# Patient Record
Sex: MALE | Race: WHITE
[De-identification: names, ages, dates, MRNs, and addresses within clinical notes are randomized per-mention and may not be internally consistent; named-entity substitution may affect disease eponyms.]

---
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
## 2018-01-02 ENCOUNTER — HOSPITAL ENCOUNTER (EMERGENCY)
Dept: HOSPITAL 62 - ER | Age: 18
Discharge: HOME | End: 2018-01-02
Payer: MEDICAID

## 2018-01-02 VITALS — DIASTOLIC BLOOD PRESSURE: 81 MMHG | SYSTOLIC BLOOD PRESSURE: 156 MMHG

## 2018-01-02 DIAGNOSIS — R06.2: Primary | ICD-10-CM

## 2018-01-02 PROCEDURE — 94640 AIRWAY INHALATION TREATMENT: CPT

## 2018-01-02 PROCEDURE — 99284 EMERGENCY DEPT VISIT MOD MDM: CPT

## 2018-01-02 NOTE — ER DOCUMENT REPORT
HPI





- HPI


Patient complains to provider of: wheezing ran out of albuter MDI


Onset: Yesterday


Pain Level: Denies


Context: 





16 yo male used his albuterol MDI up yesterday and woke up wheezing this am. No 

fever or chest pain. Hx asthma.


Associated Symptoms: None


Exacerbated by: Denies


Relieved by: Other - albuterol MDI


Similar symptoms previously: Yes


Recently seen / treated by doctor: No





- ROS


ROS below otherwise negative: Yes


Systems Reviewed and Negative: Yes All other systems reviewed and negative





- REPRODUCTIVE


Reproductive: DENIES: Pregnant:





Past Medical History





- General


Information source: Patient





- Social History


Smoking Status: Never Smoker


Frequency of alcohol use: None


Drug Abuse: None


Lives with: Family


Family History: Reviewed & Not Pertinent


Pulmonary Medical History: Reports: Hx Asthma


Renal/ Medical History: Denies: Hx Peritoneal Dialysis


Surgical Hx: Negative





Vertical Provider Document





- CONSTITUTIONAL


Agree With Documented VS: Yes


Exam Limitations: No Limitations


General Appearance: No Apparent Distress





- INFECTION CONTROL


TRAVEL OUTSIDE OF THE [place] IN LAST 30 DAYS: No





- HEENT


HEENT: Normal ENT Exam, Normocephalic





- NECK


Neck: Supple.  negative: Lymphadenopathy-Left, Lymphadenopathy-Right





- RESPIRATORY


Respiratory: Wheezing - mild insp bilaterally


O2 Sat by Pulse Oximetry: 98





- CARDIOVASCULAR


Cardiovascular: Regular Rate, Regular Rhythm





- MUSCULOSKELETAL/EXTREMETIES


Musculoskeletal/Extremeties: MAEW, FROM





- NEURO


Level of Consciousness: Awake, Alert, Appropriate





- DERM


Integumentary: Warm, Dry, No Rash





Course





- Re-evaluation


Re-evalutation: 





01/02/18 19:19


wheeze goen during treatment. Pt thinks the 1 tx will be enough. 





- Vital Signs


Vital signs: 


 











Temp Pulse Resp BP Pulse Ox


 


 98.2 F   97 H  19   156/81 H  98 


 


 01/02/18 17:13  01/02/18 17:13  01/02/18 17:13  01/02/18 17:13  01/02/18 17:13














Discharge





- Discharge


Clinical Impression: 


 Inspiratory wheezing determined by examination, hx asthma





Condition: Good


Disposition: HOME, SELF-CARE


Instructions:  Asthma ([facility]), Bronchitis With Bronchospasm (Wheezing) ([facility]), 

Family Physicians / Practices, Inhaled Bronchodilators ([facility]), Steroid Medication


Additional Instructions: 


albuterol MDI every 3 hours for cough and wheeze


to er if worse


see family practice doctor for follow up


prednisone for 4 more days


Prescriptions: 


Albuterol Sulfate [Proair HFA Inhalation Aerosol 8.5 gm MDI] 2 puff IH Q3HP PRN 

#1 hfa.aer.[email or web address]


 PRN Reason: 


Prednisone [Deltasone 20 mg Tablet] 40 mg PO DAILY #8 tablet

## 2018-01-28 ENCOUNTER — HOSPITAL ENCOUNTER (EMERGENCY)
Dept: HOSPITAL 62 - ER | Age: 18
Discharge: HOME | End: 2018-01-28
Payer: SELF-PAY

## 2018-01-28 VITALS — SYSTOLIC BLOOD PRESSURE: 151 MMHG | DIASTOLIC BLOOD PRESSURE: 81 MMHG

## 2018-01-28 DIAGNOSIS — J45.909: Primary | ICD-10-CM

## 2018-01-28 PROCEDURE — 99285 EMERGENCY DEPT VISIT HI MDM: CPT

## 2018-01-28 NOTE — ER DOCUMENT REPORT
HPI





- HPI


Pain Level: Denies


Notes: 





Patient is a 17-year-old male with a history of asthma who presents the ED 

requesting an inhaler.  Patient states that he has had some wheezing lately, 

but has not had an inhaler to use.  Patient states that he cannot afford an 

inhaler at this time.  Patient denies any other recent illness.  Patient states 

that he feels a little bit wheezy right now, but does not want any treatment 

for aside from the inhaler.  He denies any drug allergies.  Denies any smoking 

or IV drug use.  Patient states that he still eating and drinking without 

difficulties.  He is urinating normally and having normal bowel movements.  

Denies any headache, fever, neck pain, URI, sore throat, chest pain, 

palpitations, syncope, cough, shortness of breath, dyspnea, abdominal pain, 

nausea/vomiting/diarrhea, urinary retention, dysuria, hematuria, loss of 

control of bowel or bladder, numbness/tingling, saddle anesthesia, muscle 

paralysis/weakness, or rash.





- ROS


Systems Reviewed and Negative: Yes All other systems reviewed and negative





- RESPIRATORY


Respiratory: REPORTS: Trouble Breathing





- REPRODUCTIVE


Reproductive: DENIES: Pregnant:





Past Medical History





- Social History


Smoking Status: Never Smoker


Chew tobacco use (# tins/day): No


Frequency of alcohol use: None


Drug Abuse: None


Family History: Reviewed & Not Pertinent


Patient has suicidal ideation: No


Patient has homicidal ideation: No


Pulmonary Medical History: Reports: Hx Asthma


Renal/ Medical History: Denies: Hx Peritoneal Dialysis





Vertical Provider Document





- CONSTITUTIONAL


Agree With Documented VS: Yes


Notes: 





PHYSICAL EXAMINATION:





GENERAL: Well-appearing, well-nourished and in no acute distress.  A&Ox4. 

Answers questions appropriately.





HEAD: Atraumatic, normocephalic.





EYES: Pupils equal round and reactive to light, extraocular movements intact, 

sclera anicteric, conjunctiva are normal.





ENT: Nares patent and without discharge.  oropharynx clear without exudates.  

No tonsilar hypertrophy or erythema.  Moist mucous membranes. 





NECK: Normal range of motion, supple without lymphadenopathy





LUNGS: scant b/l wheeze at base.  no crackles.  No retractions





HEART: Regular rate and rhythm without murmurs, rubs, gallops.





Extremities:  No cyanosis, clubbing, or edema b/l.  Peripheral pulses 2+.  

Capillary refill less than 3 seconds.





NEUROLOGICAL: Normal speech, normal gait.  Normal sensory, motor exams 





PSYCH: Normal mood, normal affect.





SKIN: Warm, Dry, normal turgor, no rashes or lesions noted.





- INFECTION CONTROL


TRAVEL OUTSIDE OF THE [place] IN LAST 30 DAYS: No





- RESPIRATORY


O2 Sat by Pulse Oximetry: 98





Course





- Re-evaluation


Re-evalutation: 





01/28/18 13:47


Patient is an afebrile, well-hydrated, 17-year-old male who presents ED with 

mild wheezing secondary to history of asthma.  Vitals are stable.  PE is 

otherwise unremarkable.  An albuterol inhaler dispensed was provided today.  

Patient declined any imaging or breathing treatments with risks and benefits 

understood.  Low suspicion for any ACS, PE, pneumothorax, pericarditis, 

dissection, respiratory compromise, severe dehydration, sepsis, meningitis, or 

other systemic emergent condition at this time.  Patient is aware that his 

condition can change from initial presentation and he needs to monitor symptoms 

closely and seek medical attention for any acute changes.  Recommend 

conservative measures for symptoms.  Recheck with your PCM in 3-5 days.  Return 

to the ED with any worsening/concerning symptoms otherwise as reviewed in 

discharge.  Patient is in agreement.





- Vital Signs


Vital signs: 


 











Temp Pulse Resp BP Pulse Ox


 


 98.9 F   105 H  20   151/81 H  98 


 


 01/28/18 13:16  01/28/18 13:16  01/28/18 13:16  01/28/18 13:16  01/28/18 13:16














Discharge





- Discharge


Clinical Impression: 


 Wheezing





Condition: Stable


Disposition: HOME, SELF-CARE


Instructions:  Bronchodilators ([facility])


Additional Instructions: 


Maintain adequate fluid intake


Take meds as directed


tylenol/ibuprofen as needed


over the counter cold medication as needed for symptoms


Humidified air may help for any cough


F/u:  with your PCM in 3-5 days for a recheck





Return to the ED with any fever, worsening pain, chest pain, palpitations, 

syncope, worsening HA, neck pain/stiffness, shortness of breath, wheezing, 

drooling, trouble swallowing/breathing, abdominal pain, n/v/d, rash, or 

worsening/concerning symptoms otherwise.


Prescriptions: 


Albuterol Sulfate [Proair HFA Inhalation Aerosol 8.5 gm MDI] 2 puff IH Q4H PRN #

1 mdi


 PRN Reason: 


Forms:  Elevated Blood Pressure


Referrals: 


[facility] [Provider Group] - Follow up as needed


[facility] [Provider Group] - Follow up as needed

## 2018-05-09 ENCOUNTER — HOSPITAL ENCOUNTER (EMERGENCY)
Dept: HOSPITAL 62 - ER | Age: 18
Discharge: HOME | End: 2018-05-09
Payer: SELF-PAY

## 2018-05-09 VITALS — SYSTOLIC BLOOD PRESSURE: 159 MMHG | DIASTOLIC BLOOD PRESSURE: 85 MMHG

## 2018-05-09 DIAGNOSIS — J45.901: Primary | ICD-10-CM

## 2018-05-09 DIAGNOSIS — T48.6X6A: ICD-10-CM

## 2018-05-09 DIAGNOSIS — Z91.120: ICD-10-CM

## 2018-05-09 DIAGNOSIS — Z91.14: ICD-10-CM

## 2018-05-09 PROCEDURE — 99284 EMERGENCY DEPT VISIT MOD MDM: CPT

## 2018-05-09 NOTE — ER DOCUMENT REPORT
ED General





- General


Mode of Arrival: Ambulatory


Information source: Patient


TRAVEL OUTSIDE OF THE [place] IN LAST 30 DAYS: No





- General


Chief Complaint: Breathing Difficulty


Stated Complaint: DIFFICULTY BREATHING


Time Seen by Provider: 05/09/18 20:51


Notes: 


Patient is a 17-year-old male with a history of asthma presents to the 

emergency department complaining of difficulty breathing which he describes as 

tightness onset around 1300 today.  Patient states that he ran out of his 

inhaler approximately 3 days ago.  He also mentions he does not have any 

insurance so he has not been able to see a primary care physician.


 (JENNYFER VILLANUEVA)





- Related Data


Allergies/Adverse Reactions: 


 





No Known Allergies Allergy (Verified 01/28/18 13:11)


 











Past Medical History





- General


Information source: Patient





- Social History


Smoking Status: Never Smoker


Chew tobacco use (# tins/day): No


Frequency of alcohol use: None


Drug Abuse: None


Family History: Reviewed & Not Pertinent


Patient has suicidal ideation: No


Patient has homicidal ideation: No


Pulmonary Medical History: Reports: Hx Asthma


Renal/ Medical History: Denies: Hx Peritoneal Dialysis





Review of Systems





- Review of Systems


Constitutional: No symptoms reported


EENT: No symptoms reported


Cardiovascular: No symptoms reported


Respiratory: See HPI


Gastrointestinal: No symptoms reported


Genitourinary: No symptoms reported


Male Genitourinary: No symptoms reported


Musculoskeletal: No symptoms reported


Skin: No symptoms reported


Hematologic/Lymphatic: No symptoms reported


Neurological/Psychological: No symptoms reported





Physical Exam





- Vital signs


Vitals: 


 











Temp Pulse Resp BP Pulse Ox


 


 98.2 F   97 H  20   159/85 H  94 


 


 05/09/18 20:11  05/09/18 20:11  05/09/18 20:11  05/09/18 20:11  05/09/18 20:11














- Notes


Notes: 


GENERAL: Alert, interacts well. No acute distress.


HEAD: Normocephalic, atraumatic.


EYES: Pupils equal, round, and reactive to light. Extraocular movements intact.


ENT: Oral mucosa moist, tongue midline. 


NECK: Full range of motion. Supple. Trachea midline.


LUNGS: Diffuse expiratory wheezes. No rhonchi. No respiratory distress.


HEART: Regular rate and rhythm. No murmurs, gallops, or rubs.


EXTREMITIES: Moves all 4 extremities spontaneously. 


NEUROLOGICAL: Alert and oriented x3. Normal speech. 


PSYCH: Normal affect, normal mood.


SKIN: Warm, dry, normal turgor. No rashes or lesions noted.





 (JENNYFER VILLANUEVA)





Course





- Re-evaluation


Re-evalutation: 





05/09/18 21:12


The patient comes to the emergency room to get 3 albuterol inhalers because he 

states he does not have insurance.  He lives with his parents, he works as a 

 for a company that contracts with [employer].  He does not smoke.  He 

states he has to spend his money helping his parents with the bills. (MALIKA FRANCISCO)





- Vital Signs


Vital signs: 


 











Temp Pulse Resp BP Pulse Ox


 


 98.2 F   97 H  20   159/85 H  94 


 


 05/09/18 20:11  05/09/18 20:11  05/09/18 20:11  05/09/18 20:11  05/09/18 20:11














Discharge





- Discharge


Clinical Impression: 


Asthma exacerbation


Qualifiers:


 Asthma severity: mild Asthma persistence: unspecified Qualified Code(s): 

J45.901 - Unspecified asthma with (acute) exacerbation





Condition: Stable


Disposition: HOME, SELF-CARE


Additional Instructions: 


Use the inhaler 2 puffs every 4 hours as needed for wheezing.


Start the prednisone as prescribed tomorrow.


Drink plenty of fluids and get plenty of rest.


Follow-up with a local medical doctor or the [facility] for 

medical care.


Prescriptions: 


Albuterol Sulfate [Proair HFA] 1 - 2 puff IH Q4 PRN #1 inhaler


 PRN Reason: 


Prednisone [Deltasone 10 mg Tablet] 10 mg PO ASDIR PRN #21 tablet


 PRN Reason: 


Scribe Attestation: 





05/09/18 21:15


I personally performed the services described in the documentation, reviewed 

and edited the documentation which was dictated to the scribe in my presence, 

and it accurately records my words and actions. (MALIKA FRANCISCO)





Scribe Documentation





- Scribe


Written by Mariaelena:: Mariaelena Crisostomo, 5/9/2018 21:00


acting as scribe for :: Gurdeep

## 2018-07-02 ENCOUNTER — HOSPITAL ENCOUNTER (EMERGENCY)
Dept: HOSPITAL 62 - ER | Age: 18
Discharge: HOME | End: 2018-07-02
Payer: SELF-PAY

## 2018-07-02 VITALS — SYSTOLIC BLOOD PRESSURE: 145 MMHG | DIASTOLIC BLOOD PRESSURE: 72 MMHG

## 2018-07-02 DIAGNOSIS — J45.21: ICD-10-CM

## 2018-07-02 DIAGNOSIS — R06.02: ICD-10-CM

## 2018-07-02 DIAGNOSIS — Z76.0: Primary | ICD-10-CM

## 2018-07-02 PROCEDURE — 99281 EMR DPT VST MAYX REQ PHY/QHP: CPT

## 2018-07-31 ENCOUNTER — HOSPITAL ENCOUNTER (EMERGENCY)
Dept: HOSPITAL 62 - ER | Age: 18
Discharge: HOME | End: 2018-07-31
Payer: SELF-PAY

## 2018-07-31 VITALS — DIASTOLIC BLOOD PRESSURE: 85 MMHG | SYSTOLIC BLOOD PRESSURE: 136 MMHG

## 2018-07-31 DIAGNOSIS — J45.21: Primary | ICD-10-CM

## 2018-07-31 PROCEDURE — 99284 EMERGENCY DEPT VISIT MOD MDM: CPT

## 2018-07-31 NOTE — ER DOCUMENT REPORT
ED General





- General


Chief Complaint: Breathing Difficulty


Stated Complaint: DIFFICULTY BREATHING


Time Seen by Provider: 07/31/18 06:44


TRAVEL OUTSIDE OF THE [place] IN LAST 30 DAYS: No





- HPI


Notes: 





18-year-old male with history of asthma presents to the ER complaining of some 

wheezing and some mild difficulty breathing.  Since inhaler broke last night.  

He stated he needed a few puffs but could not get it states he is feeling 

better now.  Does not want a breathing treatment just wants a new inhaler.  

States he does not have any insurance denies any chest pain denies sore throat 

cough fever chills.  Denies calf pain or leg swelling.





- Related Data


Allergies/Adverse Reactions: 


 





No Known Allergies Allergy (Verified 01/28/18 13:11)


 











Past Medical History





- Social History


Smoking Status: Never Smoker


Chew tobacco use (# tins/day): No


Frequency of alcohol use: None


Drug Abuse: None


Family History: Reviewed & Not Pertinent


Patient has suicidal ideation: No


Patient has homicidal ideation: No


Pulmonary Medical History: Reports: Hx Asthma


Renal/ Medical History: Denies: Hx Peritoneal Dialysis





Review of Systems





- Review of Systems


Respiratory: Cough, Short of breath, Wheezing


Gastrointestinal: denies: Nausea, Vomiting


Genitourinary: denies: Dysuria


Skin: denies: Rash


-: Yes All other systems reviewed and negative





Physical Exam





- Vital signs


Vitals: 





 











Temp Pulse Resp BP Pulse Ox


 


 98.3 F   85   16   158/77 H  96 


 


 07/31/18 06:26  07/31/18 06:26  07/31/18 06:26  07/31/18 06:26  07/31/18 06:26














- Notes


Notes: 





GENERAL_APPEARANCE: well_nourished, alert, cooperative, no_acute_distress, no_

obvious_discomfort.


 VITALS: reviewed, see vital signs table.


 HEAD: no_swelling\tenderness on the head.


 EYES: PERRL, EOMI, conjunctiva_clear.


 NOSE: no_nasal_discharge.


 MOUTH: (-)decreased moisture.


 THROAT: no_tonsilar_inflammation, no_airway_obstruction. no_lymphadenopathy


 NECK: supple, (-)thyromegaly.


 BACK: no_back_tenderness.


 CHEST_WALL: no_chest_tenderness.


 LUNGS: Scant_wheezing, no_rales, no_rhonchi, (-)accessory muscle use, good air 

exchange bilateral.


 HEART: normal_rate, normal_rhythm, normal_S1, normal_S2, (-)S3, (-)S4, no_

murmur, no_rub.


 ABDOMEN:  soft, no_abd_tenderness, (-)guarding, (-)rebound, no_organomegaly, no

_abd_masses.


 EXTREMITIES:  good pulses in all_extremities, no_swelling\tenderness in the 

extremities, no_edema.


 SKIN: warm, dry, good_color, no_rash.


 MENTAL_STATUS: speech_clear, oriented_X_3, normal_affect, responds_

appropriately to questions.


 











Course





- Re-evaluation


Re-evalutation: 





07/31/18 07:11


She is doing better now he declines a breathing treatment he just wants an 

inhaler will give him 2 puffs of inhaler here and try to dispense them on.  

Also give him a prescription.  He states this began last night he would have 

been fine if he would had his inhaler.  I do not think any steroids or 

antibiotics are needed he has no fever or chills.  No oxygen requirements.





- Vital Signs


Vital signs: 





 











Temp Pulse Resp BP Pulse Ox


 


 98.3 F   85   16   158/77 H  96 


 


 07/31/18 06:26  07/31/18 06:26  07/31/18 06:26  07/31/18 06:26  07/31/18 06:26














Discharge





- Discharge


Clinical Impression: 


Asthma exacerbation


Qualifiers:


 Asthma severity: mild Asthma persistence: intermittent Qualified Code(s): 

J45.21 - Mild intermittent asthma with (acute) exacerbation





Condition: Good


Disposition: HOME, SELF-CARE


Instructions:  Asthma ([facility])


Prescriptions: 


Albuterol Sulfate [Proair HFA Inhalation Aerosol 8.5 gm MDI] 2 puff IH Q4H PRN #

1 mdi


 PRN Reason: 


Forms:  Return to Work

## 2018-10-18 ENCOUNTER — HOSPITAL ENCOUNTER (EMERGENCY)
Dept: HOSPITAL 62 - ER | Age: 18
Discharge: HOME | End: 2018-10-18
Payer: SELF-PAY

## 2018-10-18 VITALS — DIASTOLIC BLOOD PRESSURE: 81 MMHG | SYSTOLIC BLOOD PRESSURE: 155 MMHG

## 2018-10-18 DIAGNOSIS — J45.20: Primary | ICD-10-CM

## 2018-10-18 PROCEDURE — 99284 EMERGENCY DEPT VISIT MOD MDM: CPT

## 2018-10-27 ENCOUNTER — HOSPITAL ENCOUNTER (EMERGENCY)
Dept: HOSPITAL 62 - ER | Age: 18
Discharge: HOME | End: 2018-10-27
Payer: COMMERCIAL

## 2018-10-27 VITALS — SYSTOLIC BLOOD PRESSURE: 155 MMHG | DIASTOLIC BLOOD PRESSURE: 64 MMHG

## 2018-10-27 DIAGNOSIS — M79.662: Primary | ICD-10-CM

## 2018-10-27 DIAGNOSIS — J45.909: ICD-10-CM

## 2018-10-27 PROCEDURE — 99283 EMERGENCY DEPT VISIT LOW MDM: CPT

## 2018-10-27 NOTE — ER DOCUMENT REPORT
HPI





- HPI


Pain Level: 3


Notes: 





Patient is a 18-year-old male who presents with chief complaint of left shin 

pain.  Patient reports he just started a new job at [employer] as a  

and his leg pain started after starting work.  Patient states that the pain is 

so bad he had to call in sick today.








- REPRODUCTIVE


Reproductive: DENIES: Pregnant:





Past Medical History





- General


Information source: Patient





- Social History


Smoking Status: Never Smoker


Frequency of alcohol use: None


Drug Abuse: None


Family History: Reviewed & Not Pertinent


Pulmonary Medical History: Reports: Hx Asthma


Renal/ Medical History: Denies: Hx Peritoneal Dialysis


Past Surgical History: Reports: None





- Immunizations


Immunizations up to date: Yes





Vertical Provider Document





- CONSTITUTIONAL


Notes: 





PHYSICAL EXAMINATION:





GENERAL: Well-appearing, well-nourished and in no acute distress.





HEAD: Atraumatic, normocephalic.





EYES: Pupils equal round extraocular movements intact,  conjunctiva are normal.





ENT: Nares patent





NECK: Normal range of motion





LUNGS: No respiratory distress





Musculoskeletal: Normal range of motion, no erythema, ecchymosis or swelling 

noted to the left leg.





NEUROLOGICAL:  Normal speech, normal gait. 





PSYCH: Normal mood, normal affect.





SKIN: Warm, Dry, normal turgor, no rashes or lesions noted.





- INFECTION CONTROL


TRAVEL OUTSIDE OF THE [place] IN LAST 30 DAYS: No





Course





- Re-evaluation


Re-evalutation: 





X-rays negative for any acute findings.  Patient will be instructed to take 

ibuprofen if he is experiencing pain or inflammation.  He can also ice and 

elevate the extremities.  Patient cleared to return to work for tomorrow.





- Vital Signs


Vital signs: 


 











Temp Pulse Resp BP Pulse Ox


 


 98.3 F   99   15   155/64 H  100 


 


 10/27/18 14:42  10/27/18 14:42  10/27/18 14:42  10/27/18 14:42  10/27/18 14:42














Discharge





- Discharge


Clinical Impression: 


 Pain in left shin





Condition: Stable


Disposition: HOME, SELF-CARE


Additional Instructions: 


Your workup today was normal.  There is no evidence of any fracture or 

dislocation on your x-ray.  You may be suffering from something called 

shinsplints.  You may want to apply ice to the area, take ibuprofen 600 mg 

every 6 hours and also wears shoes that are more supportive.


Forms:  Return to Work

## 2018-10-27 NOTE — RADIOLOGY REPORT (SQ)
EXAM DESCRIPTION:  TIBIA FIBULA LEFT



COMPLETED DATE/TIME:  10/27/2018 3:27 pm



REASON FOR STUDY:  left shin pain   .  Anterior distal tib/fib pain.



COMPARISON:  None.



NUMBER OF VIEWS:  Two views.



TECHNIQUE:  Two radiographic images acquired of the left tibia and fibula to include the knee and ank
le in at least one projection.



LIMITATIONS:  None.



FINDINGS:  MINERALIZATION: Normal.

BONES: No acute fracture or dislocation.

SOFT TISSUES: No obvious swelling or radiopaque foreign body.



IMPRESSION:  No radiographic evidence of acute injury.



TECHNICAL DOCUMENTATION:  JOB ID:  [id number]

[id number]

 2011 [employer]- All Rights Reserved



Reading location - IP/workstation name: [email or web address]

## 2018-12-05 ENCOUNTER — HOSPITAL ENCOUNTER (EMERGENCY)
Dept: HOSPITAL 62 - ER | Age: 18
Discharge: HOME | End: 2018-12-05
Payer: SELF-PAY

## 2018-12-05 VITALS — SYSTOLIC BLOOD PRESSURE: 139 MMHG | DIASTOLIC BLOOD PRESSURE: 79 MMHG

## 2018-12-05 DIAGNOSIS — R09.82: ICD-10-CM

## 2018-12-05 DIAGNOSIS — J06.9: Primary | ICD-10-CM

## 2018-12-05 DIAGNOSIS — J34.89: ICD-10-CM

## 2018-12-05 DIAGNOSIS — R09.81: ICD-10-CM

## 2018-12-05 DIAGNOSIS — R05: ICD-10-CM

## 2018-12-05 DIAGNOSIS — J45.909: ICD-10-CM

## 2018-12-05 PROCEDURE — 99284 EMERGENCY DEPT VISIT MOD MDM: CPT

## 2018-12-05 NOTE — ER DOCUMENT REPORT
ED Respiratory Problem





- General


Chief Complaint: Shortness Of Breath


Stated Complaint: DIFFICULTY BREATHING


Time Seen by Provider: 12/05/18 17:02


Mode of Arrival: Ambulatory


Information source: Patient


Notes: 





18-year-old male presented to ED for complaint of cough and wheezing this 

morning when he woke up.  He is a asthmatic.  He does have an upper respiratory 

infection.  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  He states 

he does not have insurance and has no way to purchase an albuterol inhaler at 

this time until he gets paid.  Patient is alert and oriented respirations 

regular and unlabored speaking in full sentences walks with a even steady gait.


TRAVEL OUTSIDE OF THE [place] IN LAST 30 DAYS: No





- HPI


Patient complains to provider of: Asthma, Other - URI


Onset: This morning


Initiating Event: URI


Quality of pain: No pain


Severity: None


Pain Level: Denies


Context: Hx asthma


Associated symptoms: Cough, PND, Runny nose, Sinus pain/pressure, Wheezing


Similar symptoms previously: Yes


Recently seen / treated by doctor: No





- Related Data


Allergies/Adverse Reactions: 


 





No Known Allergies Allergy (Verified 12/05/18 15:43)


 











Past Medical History





- General


Information source: Patient





- Social History


Smoking Status: Never Smoker


Cigarette use (# per day): No


Chew tobacco use (# tins/day): No


Smoking Education Provided: No


Frequency of alcohol use: None


Drug Abuse: None


Lives with: Family


Family History: Reviewed & Not Pertinent


Patient has suicidal ideation: No


Patient has homicidal ideation: No





- Past Medical History


Cardiac Medical History: Reports: None


Pulmonary Medical History: Reports: Hx Asthma


EENT Medical History: Reports: None


Neurological Medical History: Reports: None


Endocrine Medical History: Reports: None


Renal/ Medical History: Reports: None


Malignancy Medical History: Reports None


GI Medical History: Reports: None


Musculoskeletal Medical History: Reports None


Skin Medical History: Reports None


Psychiatric Medical History: Reports: None


Traumatic Medical History: Reports: None


Infectious Medical History: Reports: None


Surgical Hx: Negative


Past Surgical History: Reports: None





- Immunizations


Immunizations up to date: Yes





Review of Systems





- Review of Systems


Constitutional: No symptoms reported


EENT: Nose congestion, Nose discharge, Sinus pressure, Sinus discharge


Cardiovascular: No symptoms reported


Respiratory: Cough, Wheezing


Gastrointestinal: No symptoms reported


Genitourinary: No symptoms reported


Male Genitourinary: No symptoms reported


Musculoskeletal: No symptoms reported


Skin: No symptoms reported


Hematologic/Lymphatic: No symptoms reported


Neurological/Psychological: No symptoms reported


-: Yes All other systems reviewed and negative





Physical Exam





- Vital signs


Vitals: 


 











Temp Pulse Resp BP Pulse Ox


 


 98.0 F   86   16   150/54 H  100 


 


 12/05/18 15:58  12/05/18 15:58  12/05/18 15:58  12/05/18 15:58  12/05/18 15:58











Interpretation: Normal





- General


General appearance: Appears well, Alert





- HEENT


Head: Normocephalic, Atraumatic


Eyes: Normal


Pupils: PERRL


Ears: Normal


External canal: Normal


Tympanic membrane: Normal


Sinus: Normal


Nasal: Swelling, Clear rhinorrhea


Mouth/Lips: Normal


Mucous membranes: Normal


Pharynx: Post nasal drainage


Neck: Normal





- Respiratory


Respiratory status: No respiratory distress


Chest status: Nontender


Breath sounds: Nonproductive cough


Chest palpation: Normal





- Cardiovascular


Rhythm: Regular


Heart sounds: Normal auscultation


Murmur: No





- Abdominal


Inspection: Normal


Distension: No distension


Bowel sounds: Normal


Tenderness: Nontender


Organomegaly: No organomegaly





- Back


Back: Normal, Nontender





- Extremities


General upper extremity: Normal inspection, Nontender, Normal color, Normal ROM

, Normal temperature


General lower extremity: Normal inspection, Nontender, Normal color, Normal ROM

, Normal temperature, Normal weight bearing.  No: Manny's sign





- Neurological


Neuro grossly intact: Yes


Cognition: Normal


Orientation: AAOx4


Riccardo Coma Scale Eye Opening: Spontaneous


[place] Coma Scale Verbal: Oriented


Riccardo Coma Scale Motor: Obeys Commands


[place] Coma Scale Total: 15


Speech: Normal


Motor strength normal: LUE, RUE, LLE, RLE


Sensory: Normal





- Psychological


Associated symptoms: Normal affect, Normal mood





- Skin


Skin Temperature: Warm


Skin Moisture: Dry


Skin Color: Normal





Course





- Vital Signs


Vital signs: 


 











Temp Pulse Resp BP Pulse Ox


 


 98.0 F   86   16   139/79 H  100 


 


 12/05/18 15:58  12/05/18 15:58  12/05/18 15:58  12/05/18 17:21  12/05/18 15:58














Discharge





- Discharge


Clinical Impression: 


 uri with hx of asthma





Condition: Stable


Disposition: HOME, SELF-CARE


Additional Instructions: 


UPPER RESPIRATORY ILLNESS:





     You have a viral infection of the respiratory passages -- a "cold."  This 

common infection causes nasal congestion, drainage, and often sore throat and 

cough.  It is highly contagious.  The disease usually lasts about 10 to 14 days.


     There is no "cure" for the viral infection -- it must run its course.  If 

there is a complication, such as bacterial infection in the nose, sinuses, 

middle ear, or bronchial tubes, antibiotics may be required.  The antibiotics 

won't affect the virus.


     Drink plenty of fluids.  A humidifier may help.  An expectorant medication 

or decongestant may make you more comfortable.  Use acetaminophen or ibuprofen 

for fever or aches.


     See the doctor if fever persists over two days, if there is any 

significant worsening of your symptoms, or if you simply fail to improve as 

expected.








COUGH-SUPPRESSANT & EXPECTORANT MEDICATION:


     You are to use a cough medication as needed for relief of symptoms.  This 

medicine is a combination of an expectorant (to make the mucous thinner and 

more easily "coughed up") and a cough suppressant (to reduce the frequency of 

coughing).


     The cough-suppressant medicine is related to narcotics.  You may 

experience mild nausea and sleepiness.  Some patients who are very sensitive to 

narcotics may have stomach pain from this medicine. Taking the medicine with 

food reduces these side effects.  Do not drive or work with machinery until you 

know how this medicine affects you.


     The expectorant should have no side effects.  Iodine-containing 

expectorants (such as organidin) should not be taken by persons with active 

thyroid disease unless approved by your doctor.


     Call the doctor if you develop shortness of breath, hives, rash, itching, 

lightheadedness, or severe nausea and vomiting.








INHALED BRONCHODILATORS:


     You have received a treatment of and/or prescription for an inhaled 

bronchodilator -- a medication which stimulates the airways in the lung to 

dilate.  This improves the flow of air in asthma, bronchitis, and emphysema.


     These medicines have some similarity to adrenaline, and can cause similar 

side effects:  shakiness, racing heart, and a sense of nervousness.  These side 

effects decrease with time.  Contact your doctor if these side effects are 

severe.


     Do not over-use the medicine.  Too-frequent use of the inhaler may make it 

ineffective.  Call your doctor if the inhaler is not controlling your symptoms 

at the prescribed doses.








USE OF ACETAMINOPHEN (Tylenol):


     Acetaminophen may be taken for pain relief or fever control. It's much 

safer than aspirin, offering a wider range of "safe" dosages.  It is safe 

during pregnancy.  Some brand names are Tylenol, Panadol, Datril, Anacin 3, 

Tempra, and Liquiprin. Acetaminophen can be repeated every four hours.  The 

following are maximum recommended dosages:


>89 pounds or adults          650 mg to 900 mg


Acetaminophen can be repeated every four hours.  Maximum dose not to exceed 

4000 mg a day.








FOLLOW-UP CARE:


If you have been referred to a physician for follow-up care, call the physician

s office for an appointment as you were instructed or within the next two days.

  If you experience worsening or a significant change in your symptoms, notify 

the physician immediately or return to the Emergency Department at any time for 

re-evaluation.





Prescriptions: 


Albuterol Sulfate [Proair HFA Inhalation Aerosol 8.5 gm MDI] 2 puff IH Q4H PRN #

1 mdi


 PRN Reason: 


Forms:  Elevated Blood Pressure, Return to Work


Referrals: 


[facility] [Provider Group] - Follow up as needed


[facility] [Provider Group] - Follow up as needed

## 2019-02-17 ENCOUNTER — HOSPITAL ENCOUNTER (EMERGENCY)
Dept: HOSPITAL 62 - ER | Age: 19
Discharge: HOME | End: 2019-02-17
Payer: SELF-PAY

## 2019-02-17 VITALS — SYSTOLIC BLOOD PRESSURE: 161 MMHG | DIASTOLIC BLOOD PRESSURE: 87 MMHG

## 2019-02-17 DIAGNOSIS — R06.02: ICD-10-CM

## 2019-02-17 DIAGNOSIS — J45.21: Primary | ICD-10-CM

## 2019-02-17 PROCEDURE — 99284 EMERGENCY DEPT VISIT MOD MDM: CPT

## 2019-02-17 PROCEDURE — 94640 AIRWAY INHALATION TREATMENT: CPT

## 2019-02-17 NOTE — ER DOCUMENT REPORT
ED General





- General


Chief Complaint: Asthma Exacerbation


Stated Complaint: TROUBLE BREATHING


Time Seen by Provider: 02/17/19 21:23


Notes: 





Patient is a 18-year-old male with a past medical history of asthma who presents

requesting an inhaler.  Patient states that he does not have an inhaler 

available at home, has been feeling tight, somewhat short of breath today and 

states that he would usually use an albuterol pump inhaler but does not have one

available.  States that his symptoms started gradually and have been ongoing 

since that time.  Exertion worsens his symptoms.  Denies any cough, sputum 

production, fever or constitutional symptoms.  Patient does not have a primary 

care physician due to lack of insurance.  He is declining any imaging or are 

mentioned here in the emergency department.


TRAVEL OUTSIDE OF THE [place] IN LAST 30 DAYS: No





- Related Data


Allergies/Adverse Reactions: 


                                        





No Known Allergies Allergy (Verified 12/05/18 15:43)


   











Past Medical History





- General


Information source: Patient





- Social History


Smoking Status: Never Smoker


Frequency of alcohol use: None


Drug Abuse: None


Family History: Reviewed & Not Pertinent


Patient has suicidal ideation: No


Patient has homicidal ideation: No


Pulmonary Medical History: Reports: Hx Asthma


Renal/ Medical History: Denies: Hx Peritoneal Dialysis





- Immunizations


Immunizations up to date: Yes





Review of Systems





- Review of Systems


Notes: 





Constitutional: Negative for fever.


HENT: Negative for sore throat.


Eyes: Negative for visual changes.


Cardiovascular: Negative for chest pain.


Respiratory: Positive for shortness of breath.


Gastrointestinal: Negative for abdominal pain, vomiting or diarrhea.


Genitourinary: Negative for dysuria.


Musculoskeletal: Negative for back pain.


Skin: Negative for rash.


Neurological: Negative for headaches, weakness or numbness.





10 point ROS negative except as marked above and in HPI.





Physical Exam





- Vital signs


Vitals: 


                                        











Temp Pulse Resp BP Pulse Ox


 


 98.1 F   92   17   161/87 H  98 


 


 02/17/19 20:36  02/17/19 20:36  02/17/19 20:36  02/17/19 20:36  02/17/19 20:36











Interpretation: Hypertensive


Notes: 





PHYSICAL EXAMINATION:





GENERAL: Well-appearing, well-nourished and in no acute distress.





HEAD: Atraumatic, normocephalic.





EYES: Pupils equal round and reactive to light, extraocular movements intact, 

sclera anicteric, conjunctiva are normal.





ENT: nares patent, oropharynx clear without exudates.  Moist mucous membranes.





NECK: Normal range of motion, supple without lymphadenopathy





LUNGS: Breath sounds clear to auscultation bilaterally and equal.  Faint 

expiratory wheezing in all lung fields.





HEART: Regular rate and rhythm without murmurs





ABDOMEN: Soft, nontender, normoactive bowel sounds.  No guarding, no rebound.  

No masses appreciated.





EXTREMITIES: Normal range of motion, no pitting or edema.  No cyanosis.





NEUROLOGICAL: No focal neurological deficits. Moves all extremities 

spontaneously and on command.





PSYCH: Normal mood, normal affect.





SKIN: Warm, Dry, normal turgor, no rashes or lesions noted.





Course





- Re-evaluation


Re-evalutation: 





02/17/19 21:38


Patient presents with a mild exacerbation of their baseline asthma.  Mild 

wheezing at time of presentation but vitals do not show significant hypoxemia or

tachypnea.  No retractions.  Patient declined nebulizer treatment stating that 

he prefers an albuterol pump.  He has been provided with an albuterol inhaler w

ith spacer and refills at home.  Patient declines chest x-ray and I think this 

is reasonable given the absence of any infectious symptoms.   Patient able to 

ambulate without any respiratory distress.   I do not suspect an acute 

alternative pathology at this time based on history and exam including acute 

pulmonary embolus, ACS, pneumothorax, or aortic dissection. At this time will 

discharge with return precautions and follow-up recommendations.  Verbal 

discharge instructions given a the bedside and opportunity for questions given. 

Medication warnings reviewed. Patient is in agreement with this plan and has 

verbalized understanding of return precautions and the need for primary care 

follow-up in the next 24-72 hours.





- Vital Signs


Vital signs: 


                                        











Temp Pulse Resp BP Pulse Ox


 


 98.1 F   110 H  20   161/87 H  95 


 


 02/17/19 20:36  02/17/19 21:51  02/17/19 21:51  02/17/19 20:36  02/17/19 21:51














Discharge





- Discharge


Clinical Impression: 


 Shortness of breath





Asthma exacerbation


Qualifiers:


 Asthma severity: mild Asthma persistence: intermittent Qualified Code(s): 

J45.21 - Mild intermittent asthma with (acute) exacerbation





Condition: Good


Disposition: HOME, SELF-CARE


Additional Instructions: 


You were seen for an asthma exacerbation.  You have been sent home with an 

albuterol inhaler and spacer as well as refills.   Please also follow closely 

with your primary care physician.  Return to emergency around immediately if you

have worsening of your shortness of breath, if you develop fever greater than 

101, persistent cough, persistent vomiting, pass out, or any other symptoms that

are concerning to you.


Prescriptions: 


Albuterol Sulfate [Proair HFA Inhalation Aerosol 8.5 gm MDI] 2 puff IH Q4H PRN 

#1 mdi


 PRN Reason:

## 2019-09-27 ENCOUNTER — HOSPITAL ENCOUNTER (EMERGENCY)
Dept: HOSPITAL 62 - ER | Age: 19
Discharge: HOME | End: 2019-09-27
Payer: SELF-PAY

## 2019-09-27 VITALS — SYSTOLIC BLOOD PRESSURE: 150 MMHG | DIASTOLIC BLOOD PRESSURE: 84 MMHG

## 2019-09-27 DIAGNOSIS — J45.21: Primary | ICD-10-CM

## 2019-09-27 PROCEDURE — 99284 EMERGENCY DEPT VISIT MOD MDM: CPT

## 2019-09-27 NOTE — ER DOCUMENT REPORT
HPI





- HPI


Time Seen by Provider: 09/27/19 01:21


Pain Level: Denies


Context: 


Patient is a 19-year-old male with a history of asthma that comes to the 

emergency department for chief complaint of wheezing that started earlier today.

 He states he does not have insurance or a primary care at this time so he came 

in for the supply of an inhaler.  He denies smoking, fever, cough, or any other 

complaints at this time.  He denies any other medical history.








- REPRODUCTIVE


Reproductive: DENIES: Pregnant:





Past Medical History





- General


Information source: Patient





- Social History


Smoking Status: Never Smoker


Chew tobacco use (# tins/day): No


Frequency of alcohol use: None


Drug Abuse: None


Lives with: Family


Family History: Reviewed & Not Pertinent


Patient has suicidal ideation: No


Patient has homicidal ideation: No


Pulmonary Medical History: Reports: Hx Asthma


Renal/ Medical History: Denies: Hx Peritoneal Dialysis





- Immunizations


Immunizations up to date: Yes





Vertical Provider Document





- CONSTITUTIONAL


General Appearance: WD/WN, No Apparent Distress





- INFECTION CONTROL


TRAVEL OUTSIDE OF THE [place] IN LAST 30 DAYS: No





- HEENT


HEENT: Atraumatic, Normal ENT Exam, Normocephalic





- NECK


Neck: Normal Inspection





- RESPIRATORY


Respiratory: Breath Sounds Normal, No Respiratory Distress, Wheezing - Faint 

expiratory wheezes bilaterally, clear lung sounds otherwise, no tachypnea, no 

signs of distress





- CARDIOVASCULAR


Cardiovascular: Regular Rate, Regular Rhythm





- GI/ABDOMEN


Gastrointestinal: Abdomen Soft, Abdomen Non-Tender





- BACK


Back: Normal Inspection





- MUSCULOSKELETAL/EXTREMETIES


Musculoskeletal/Extremeties: MAEW, FROM, Non-Tender





- NEURO


Level of Consciousness: Awake, Alert, Appropriate


Motor/Sensory: No Motor Deficit, No Sensory Deficit





- DERM


Integumentary: Warm, Dry, No Rash





Course





- Re-evaluation


Re-evalutation: 


Patient has a faint expiratory wheeze on exam, no tachypnea, speaks in full 

sentences, no hypoxia, no respiratory distress.  No fever.  No cough.  I 

recommended albuterol treatment and discussed chest x-ray, patient declined 

both.  He states he simply would like an inhaler and he will follow-up with 

primary care referral.  I feel this is appropriate based on his benign 

presentation with only very mild exacerbation of asthma.  Patient provided with 

this, discussed follow-up, discussed return precautions.  Patient states 

understanding and agreement.  Stable at time of discharge.








- Vital Signs


Vital signs: 


                                        











Temp Pulse Resp BP Pulse Ox


 


 98.2 F   87   16   150/84 H  96 


 


 09/27/19 00:48  09/27/19 00:48  09/27/19 00:48  09/27/19 00:48  09/27/19 00:48














Discharge





- Discharge


Clinical Impression: 


Asthma exacerbation


Qualifiers:


 Asthma severity: mild Asthma persistence: intermittent Qualified Code(s): 

J45.21 - Mild intermittent asthma with (acute) exacerbation





Condition: Stable


Disposition: HOME, SELF-CARE


Additional Instructions: 


Your evaluation is consistent with a mild asthma exacerbation.


Use the inhaler as needed as directed, use the spacer with this.


Follow-up with the listed primary care referral for additional evaluation and 

management.


Come back if you worsen including difficulty breathing, fever, or any other 

concerning symptoms.


Prescriptions: 


Albuterol Sulfate [Proair HFA Inhalation Aerosol 8.5 gm MDI] 2 puff IH Q4H PRN 

#1 mdi


 PRN Reason: 


Forms:  Elevated Blood Pressure


Referrals: 


[facility] [Provider Group] - Follow up as needed


[facility] [Provider Group] - Follow up as needed

## 2019-10-25 ENCOUNTER — HOSPITAL ENCOUNTER (EMERGENCY)
Dept: HOSPITAL 62 - ER | Age: 19
Discharge: HOME | End: 2019-10-25
Payer: SELF-PAY

## 2019-10-25 VITALS — DIASTOLIC BLOOD PRESSURE: 77 MMHG | SYSTOLIC BLOOD PRESSURE: 147 MMHG

## 2019-10-25 DIAGNOSIS — Z76.0: ICD-10-CM

## 2019-10-25 DIAGNOSIS — J45.20: Primary | ICD-10-CM

## 2019-10-25 PROCEDURE — 99284 EMERGENCY DEPT VISIT MOD MDM: CPT

## 2019-10-25 NOTE — ER DOCUMENT REPORT
HPI





- HPI


Time Seen by Provider: 10/25/19 02:06


Pain Level: Denies


Context: 


Patient is a 19-year-old male with a history of asthma that comes to the 

emergency department for chief complaint of running out of his inhaler.  He 

states he had a cold and used his inhaler frequently, as result he ran out, 

denies current wheezing or shortness of breath at this point however.  He denies

fever/chills, or any other complaints at this time.  He denies smoking.





- REPRODUCTIVE


Reproductive: DENIES: Pregnant:





Past Medical History





- General


Information source: Patient





- Social History


Smoking Status: Never Smoker


Chew tobacco use (# tins/day): No


Frequency of alcohol use: None


Drug Abuse: None


Lives with: Family


Family History: Reviewed & Not Pertinent


Patient has suicidal ideation: No


Patient has homicidal ideation: No


Pulmonary Medical History: Reports: Hx Asthma


Renal/ Medical History: Denies: Hx Peritoneal Dialysis


Surgical Hx: Negative





- Immunizations


Immunizations up to date: Yes


Hx Diphtheria, Pertussis, Tetanus Vaccination: Yes





Vertical Provider Document





- CONSTITUTIONAL


General Appearance: WD/WN, No Apparent Distress





- INFECTION CONTROL


TRAVEL OUTSIDE OF THE [place] IN LAST 30 DAYS: No





- HEENT


HEENT: Atraumatic, Normal ENT Exam, Normocephalic





- NECK


Neck: Normal Inspection





- RESPIRATORY


Respiratory: Breath Sounds Normal, No Respiratory Distress





- CARDIOVASCULAR


Cardiovascular: Regular Rate, Regular Rhythm





- GI/ABDOMEN


Gastrointestinal: Abdomen Soft, Abdomen Non-Tender





- BACK


Back: Normal Inspection





- MUSCULOSKELETAL/EXTREMETIES


Musculoskeletal/Extremeties: MAEW, FROM, Non-Tender





- NEURO


Level of Consciousness: Awake, Alert, Appropriate


Motor/Sensory: No Motor Deficit, No Sensory Deficit





Course





- Re-evaluation


Re-evalutation: 


Patient reporting running out of his inhaler at home because of recent viral 

illness which she has recovered from now.  No hypoxia, wheezing, or signs of d

istress.  Provided with inhaler refill, discussed follow-up and return 

precautions, discussed primary care follow-up as well.  Patient states 

appreciation and agreement.





- Vital Signs


Vital signs: 


                                        











Temp Pulse Resp BP Pulse Ox


 


 99.9 F   79   18   141/58 H  98 


 


 10/25/19 00:29  10/25/19 00:29  10/25/19 00:29  10/25/19 00:29  10/25/19 00:29














Discharge





- Discharge


Clinical Impression: 


Asthma


Qualifiers:


 Asthma severity: unspecified severity Asthma persistence: intermittent Asthma 

complication type: unspecified Qualified Code(s): J45.20 - Mild intermittent 

asthma, uncomplicated





Condition: Stable


Disposition: HOME, SELF-CARE


Additional Instructions: 


Fill and take the albuterol as needed.


Follow-up with primary care for additional evaluation and management.  


Return if you worsen including difficulty breathing, fever, or any other 

concerns.





Prescriptions: 


Albuterol Sulfate [Proair HFA Inhalation Aerosol 8.5 gm MDI] 2 puff IH Q4H PRN 

#1 mdi


 PRN Reason: 


Referrals: 


[facility] [Provider Group] - Follow up as needed


[facility] [Provider Group] - Follow up as needed

## 2019-11-21 ENCOUNTER — HOSPITAL ENCOUNTER (EMERGENCY)
Dept: HOSPITAL 62 - ER | Age: 19
Discharge: HOME | End: 2019-11-21
Payer: SELF-PAY

## 2019-11-21 VITALS — DIASTOLIC BLOOD PRESSURE: 74 MMHG | SYSTOLIC BLOOD PRESSURE: 155 MMHG

## 2019-11-21 DIAGNOSIS — Z76.0: Primary | ICD-10-CM

## 2019-11-21 DIAGNOSIS — J45.909: ICD-10-CM

## 2019-11-21 PROCEDURE — 99281 EMR DPT VST MAYX REQ PHY/QHP: CPT

## 2019-11-21 NOTE — ER DOCUMENT REPORT
ED General





- General


Chief Complaint: Other


Stated Complaint: NEEDS AN INHALER


Time Seen by Provider: 11/21/19 19:56


Primary Care Provider: 


[facility] [Provider Group] - Follow up as needed


TRAVEL OUTSIDE OF THE [place] IN LAST 30 DAYS: No





- HPI


Notes: 





19-year-old male to the emergency department with request for albuterol inhaler 

refill.  He states that he was at work when he accidentally dropped the inhaler 

and ran over it.  He states that he is not having any symptoms.  He states he 

only uses the inhaler as needed.  He states he does not want to be without it.  

He denies any other complaints today.  He does not smoke.





- Related Data


Allergies/Adverse Reactions: 


                                        





No Known Allergies Allergy (Verified 12/05/18 15:43)


   








Home Medications: albuterol inhaler





Past Medical History





- General


Information source: Patient





- Social History


Smoking Status: Never Smoker


Frequency of alcohol use: None


Drug Abuse: None


Family History: Reviewed & Not Pertinent


Patient has suicidal ideation: No


Patient has homicidal ideation: No


Pulmonary Medical History: Reports: Hx Asthma


Renal/ Medical History: Denies: Hx Peritoneal Dialysis





- Immunizations


Immunizations up to date: Yes


Hx Diphtheria, Pertussis, Tetanus Vaccination: Yes





Review of Systems





- Review of Systems


Constitutional: denies: Chills, Fever


EENT: No symptoms reported


Cardiovascular: denies: Chest pain, Palpitations, Syncope


Respiratory: denies: Cough, Short of breath, Wheezing


Gastrointestinal: denies: Abdominal pain, Diarrhea, Nausea, Vomiting


Genitourinary: No symptoms reported


Musculoskeletal: No symptoms reported


Skin: No symptoms reported


-: Yes All other systems reviewed and negative





Physical Exam





- Vital signs


Vitals: 


                                        











Temp Pulse Resp BP Pulse Ox


 


 97.7 F   88   18   155/74 H  100 


 


 11/21/19 19:50  11/21/19 19:50  11/21/19 19:50  11/21/19 19:50  11/21/19 19:50











Interpretation: Normal





- General


General appearance: Appears well, Alert


In distress: None





- HEENT


Head: Normocephalic, Atraumatic


Eyes: Normal


Pupils: PERRL





- Respiratory


Respiratory status: No respiratory distress


Chest status: Nontender.  No: Accessory muscle use


Breath sounds: Normal.  No: Productive cough, Rales, Rhonchi, Wheezing


Chest palpation: Normal





- Cardiovascular


Rhythm: Regular


Heart sounds: Normal auscultation


Murmur: No





- Back


Back: Normal, Nontender





- Psychological


Associated symptoms: Normal affect, Normal mood





- Skin


Skin Temperature: Warm


Skin Moisture: Dry


Skin Color: Normal





Course





- Re-evaluation


Re-evalutation: 





11/21/19 20:28


Impression: Medication refill.  Will write for albuterol inhaler and discharged 

with one from the emergency department as well.  Encouraged to return if any 

worsening symptoms.  Gave information for [facility] as patient is 

without insurance currently.  He agrees with plan.





- Vital Signs


Vital signs: 


                                        











Temp Pulse Resp BP Pulse Ox


 


 97.7 F   88   18   155/74 H  100 


 


 11/21/19 19:50  11/21/19 19:50  11/21/19 19:50  11/21/19 19:50  11/21/19 19:50














Discharge





- Discharge


Clinical Impression: 


 Medication refill





Condition: Stable


Disposition: HOME, SELF-CARE


Prescriptions: 


Albuterol Sulfate [Albuterol Sulfate Hfa] 2 puff IH Q4H PRN #1 [email or web address]


 PRN Reason: 


Referrals: 


[facility] [Provider Group] - Follow up as needed

## 2019-12-19 ENCOUNTER — HOSPITAL ENCOUNTER (EMERGENCY)
Dept: HOSPITAL 62 - ER | Age: 19
Discharge: HOME | End: 2019-12-19
Payer: SELF-PAY

## 2019-12-19 VITALS — SYSTOLIC BLOOD PRESSURE: 136 MMHG | DIASTOLIC BLOOD PRESSURE: 74 MMHG

## 2019-12-19 DIAGNOSIS — J45.21: ICD-10-CM

## 2019-12-19 DIAGNOSIS — Z76.0: Primary | ICD-10-CM

## 2019-12-19 DIAGNOSIS — I10: ICD-10-CM

## 2019-12-19 PROCEDURE — 99281 EMR DPT VST MAYX REQ PHY/QHP: CPT

## 2019-12-19 NOTE — ER DOCUMENT REPORT
HPI





- HPI


Time Seen by Provider: 12/19/19 10:21


Pain Level: Denies


Notes: 





19-year-old male presents the emergency room for refill of his Ventolin inhaler.

 Patient states that he used up all of his Ventolin inhaler due to having a 

recent upper respiratory viral illness but he does have a history of asthma.  

Patient states that insurance will not refill another Ventolin inhaler.  Patient

states he is having active wheezing.  patient is requesting a Ventolin inhaler 

from the emergency room.  Denies fevers, chills,  chest pain,palpitations,  

shortness of breath, dyspnea, nausea, vomiting, diarrhea, abdominal pain, 

hematuria,blurred vision, double vision, loss of vision, speech changes, LH, 

dizziness, syncope, headaches, wheezing, ST, URI, neck pain, weakness, bowel or 

bladder dysfunction, saddle anesthesia, numbness or tingling in bilateral upper 

or lower extremities equally, muscle paralysis, weakness in bilateral upper or 

lower extremities equally or rash.








- REPRODUCTIVE


Reproductive: DENIES: Pregnant:





Past Medical History





- General


Information source: Patient





- Social History


Smoking Status: Never Smoker


Family History: Reviewed & Not Pertinent


Patient has suicidal ideation: No


Patient has homicidal ideation: No


Pulmonary Medical History: Reports: Hx Asthma


Renal/ Medical History: Denies: Hx Peritoneal Dialysis





- Immunizations


Immunizations up to date: Yes


Hx Diphtheria, Pertussis, Tetanus Vaccination: Yes





Vertical Provider Document





- CONSTITUTIONAL


Agree With Documented VS: Yes


Exam Limitations: No Limitations


General Appearance: WD/WN


Notes: 





PHYSICAL EXAMINATION:reviewed vital signs by RN





GENERAL: Well-appearing, well-nourished and in no acute distress.





HEAD: Atraumatic, normocephalic.





EYES: Pupils equal round and reactive to light, extraocular movements intact, 

sclera anicteric, conjunctiva are normal.





ENT: Nares patent, oropharynx clear without exudates.  Moist mucous membranes.





NECK: Normal range of motion, supple without lymphadenopathy





LUNGS: Breath sounds clear to auscultation bilaterally and equal.  No wheezes 

rales or rhonchi.





HEART: Regular rate and rhythm without murmurs





ABDOMEN: Soft, nontender, nondistended abdomen.  No guarding, no rebound.  No 

masses appreciated.





Musculoskeletal: Normal range of motion, no pitting or edema.  No cyanosis.





NEUROLOGICAL: Cranial nerves grossly intact.  Normal speech, normal gait.  

Normal sensory, motor exams 





PSYCH: Normal mood, normal affect.





SKIN: Warm, Dry, normal turgor, no rashes or lesions noted.





- INFECTION CONTROL


TRAVEL OUTSIDE OF THE [place] IN LAST 30 DAYS: No





Course





- Re-evaluation


Re-evalutation: 





12/19/19 16:55


Afebrile vital stable no distress.  Nurse's notes reviewed.  Patient given pro-

air rescue inhaler from the emergency room, manual blood pressure rechecked and 

is much lower.  Advised to follow low-salt diet.  Follow-up with primary care 

provider within the next 24 to 48 hours.  Carry rescue inhaler on person.  after

performing a Medical Screening Examination, I estimate there is LOW risk for 

ACUTE CORONARY SYNDROME, PULMONARY EMBOLI, RESPIRATORY FAILURE, SEPSIS OR 

MENINGITIS, thus I consider the discharge disposition reasonable.  I have 

reevaluated this patient multiple times and no significant life threatening 

changes are noted. The patient and I have discussed the diagnosis and risks, and

we agree with discharging home with close follow-up. We also discussed returning

to the Emergency Department immediately if new or worsening symptoms occur. We 

have discussed the symptoms which are most concerning (e.g., changing or 

worsening pain, trouble swallowing or breathing, neck stiffness, fever) that 

necessitate immediate return.





- Vital Signs


Vital signs: 


                                        











Temp Pulse Resp BP Pulse Ox


 


 97.6 F   77   18   136/74 H  99 


 


 12/19/19 10:08  12/19/19 10:08  12/19/19 10:08  12/19/19 10:28  12/19/19 10:08














Discharge





- Discharge


Clinical Impression: 


 Medication refill





Asthma exacerbation


Qualifiers:


 Asthma severity: mild Asthma persistence: intermittent Qualified Code(s): 

J45.21 - Mild intermittent asthma with (acute) exacerbation





Hypertension


Qualifiers:


 Hypertension type: unspecified Qualified Code(s): I10 - Essential (primary) 

hypertension





Condition: Stable


Disposition: HOME, SELF-CARE


Instructions:  Asthma ([facility]), High Blood Pressure ([facility])


Additional Instructions: 


take medication as directed. follow up with pcp within 24 hours. follow a low 

salt diet. 





Return immediately for any new or worsening symptoms.





Follow up with primary care provider, call tomorrow to make followup 

appointment.








Referrals: 


FABI GILLIAM MD [ACTIVE STAFF] - Follow up as needed

## 2019-12-19 NOTE — ER DOCUMENT REPORT
ED Medical Screen (RME)





- General


Chief Complaint: Medication Refill


Stated Complaint: MEDICATION REFILL


Time Seen by Provider: 12/19/19 10:21


Primary Care Provider: 


FABI GILLIAM MD [ACTIVE STAFF] - Follow up as needed


TRAVEL OUTSIDE OF THE [place] IN LAST 30 DAYS: No





- HPI


Notes: 





12/19/19 10:35


19-year-old male presents the emergency room for refill of his Ventolin inhaler.

 Patient states that he used up all of his Ventolin inhaler due to having a 

recent upper respiratory viral illness but he does have a history of asthma.  

Patient states that insurance will not refill another Ventolin inhaler.  Patient

states he is having active wheezing.  patient is requesting a Ventolin inhaler 

from the emergency room.  Denies fevers, chills,  chest pain,palpitations,  

shortness of breath, dyspnea, nausea, vomiting, diarrhea, abdominal pain, 

hematuria,blurred vision, double vision, loss of vision, speech changes, LH, 

dizziness, syncope, headaches, wheezing, ST, URI, neck pain, weakness, bowel or 

bladder dysfunction, saddle anesthesia, numbness or tingling in bilateral upper 

or lower extremities equally, muscle paralysis, weakness in bilateral upper or 

lower extremities equally or rash.





- Related Data


Allergies/Adverse Reactions: 


                                        





No Known Allergies Allergy (Verified 12/05/18 15:43)


   








Home Medications: Albuterol





Past Medical History





- General


Information source: Patient


Pulmonary Medical History: Reports: Hx Asthma


Renal/ Medical History: Denies: Hx Peritoneal Dialysis





- Immunizations


Immunizations up to date: Yes


Hx Diphtheria, Pertussis, Tetanus Vaccination: Yes





Review of Systems





- Review of Systems


Constitutional: No symptoms reported


EENT: No symptoms reported


Cardiovascular: No symptoms reported


Respiratory: See HPI


Gastrointestinal: No symptoms reported


Genitourinary: No symptoms reported


Male Genitourinary: No symptoms reported


Musculoskeletal: No symptoms reported


Skin: No symptoms reported


Hematologic/Lymphatic: No symptoms reported


Neurological/Psychological: No symptoms reported





Physical Exam





- Vital signs


Vitals: 


                                        











Temp Pulse Resp BP Pulse Ox


 


 97.6 F   77   18   162/74 H  99 


 


 12/19/19 10:04  12/19/19 10:04  12/19/19 10:04  12/19/19 10:04  12/19/19 10:04














Course





- Vital Signs


Vital signs: 


                                        











Temp Pulse Resp BP Pulse Ox


 


 97.6 F   77   18   136/74 H  99 


 


 12/19/19 10:08  12/19/19 10:08  12/19/19 10:08  12/19/19 10:28  12/19/19 10:08














Doctor's Discharge





- Discharge


Clinical Impression: 


 Medication refill





Asthma exacerbation


Qualifiers:


 Asthma severity: mild Asthma persistence: intermittent Qualified Code(s): 

J45.21 - Mild intermittent asthma with (acute) exacerbation





Hypertension


Qualifiers:


 Hypertension type: unspecified Qualified Code(s): I10 - Essential (primary) 

hypertension





Condition: Stable


Disposition: HOME, SELF-CARE


Instructions:  Asthma ([facility]), High Blood Pressure ([facility])


Additional Instructions: 


take medication as directed. follow up with pcp within 24 hours. follow a low 

salt diet. 





Return immediately for any new or worsening symptoms.





Follow up with primary care provider, call tomorrow to make followup 

appointment.








Referrals: 


FABI GILLIAM MD [ACTIVE STAFF] - Follow up as needed

## 2020-01-24 ENCOUNTER — HOSPITAL ENCOUNTER (EMERGENCY)
Dept: HOSPITAL 62 - ER | Age: 20
Discharge: HOME | End: 2020-01-24
Payer: SELF-PAY

## 2020-01-24 VITALS — SYSTOLIC BLOOD PRESSURE: 166 MMHG | DIASTOLIC BLOOD PRESSURE: 78 MMHG

## 2020-01-24 DIAGNOSIS — J45.909: ICD-10-CM

## 2020-01-24 DIAGNOSIS — Z76.0: Primary | ICD-10-CM

## 2020-01-24 DIAGNOSIS — Z79.899: ICD-10-CM

## 2020-01-24 PROCEDURE — 99281 EMR DPT VST MAYX REQ PHY/QHP: CPT

## 2020-01-24 NOTE — ER DOCUMENT REPORT
HPI





- HPI


Time Seen by Provider: 01/24/20 10:14


Pain Level: Denies


Notes: 





19-year-old male patient with history of asthma presenting with request for 

medication refill on albuterol.  Patient states he ran out yesterday.  He denies

any complaints physically today.








- REPRODUCTIVE


Reproductive: DENIES: Pregnant:





Past Medical History





- General


Information source: Patient





- Social History


Smoking Status: Never Smoker


Chew tobacco use (# tins/day): No


Frequency of alcohol use: None


Drug Abuse: None


Family History: Reviewed & Not Pertinent


Patient has suicidal ideation: No


Patient has homicidal ideation: No


Pulmonary Medical History: Reports: Hx Asthma


Renal/ Medical History: Denies: Hx Peritoneal Dialysis





- Immunizations


Immunizations up to date: Yes


Hx Diphtheria, Pertussis, Tetanus Vaccination: Yes





Vertical Provider Document





- CONSTITUTIONAL


Notes: 





PHYSICAL EXAMINATION:





GENERAL: Well-appearing, well-nourished and in no acute distress.





HEAD: Atraumatic, normocephalic.





EYES: Pupils equal round extraocular movements intact,  conjunctiva are normal.





ENT: Nares patent





NECK: Normal range of motion





LUNGS: No respiratory distress, lung sounds clear and equal bilaterally.





Musculoskeletal: Normal range of motion





NEUROLOGICAL:  Normal speech, normal gait. 





PSYCH: Normal mood, normal affect.





SKIN: Warm, Dry, normal turgor, no rashes or lesions noted.





- INFECTION CONTROL


TRAVEL OUTSIDE OF THE [place] IN LAST 30 DAYS: No





Course





- Re-evaluation


Re-evalutation: 





Patient appears well, nontoxic, lung sounds clear and equal bilaterally.  

Patient will be dispensed an albuterol inhaler, he states he does not have 

insurance and cannot afford to get them filled.








- Vital Signs


Vital signs: 


                                        











Temp Pulse Resp BP Pulse Ox


 


 97.7 F   85   16   166/78 H  100 


 


 01/24/20 09:56  01/24/20 09:56  01/24/20 09:56  01/24/20 09:56  01/24/20 09:56














Discharge





- Discharge


Clinical Impression: 


 Encounter for medication refill





Condition: Stable


Disposition: HOME, SELF-CARE


Additional Instructions: 


Please use the albuterol inhaler as prescribed, take 2 puffs every 4 hours as 

needed for wheezing or shortness of breath.  Return to the emergency department 

with any new or worsening symptoms.

## 2020-02-02 ENCOUNTER — HOSPITAL ENCOUNTER (EMERGENCY)
Dept: HOSPITAL 62 - ER | Age: 20
Discharge: HOME | End: 2020-02-02
Payer: SELF-PAY

## 2020-02-02 VITALS — SYSTOLIC BLOOD PRESSURE: 157 MMHG | DIASTOLIC BLOOD PRESSURE: 78 MMHG

## 2020-02-02 DIAGNOSIS — J45.909: Primary | ICD-10-CM

## 2020-02-02 PROCEDURE — 99281 EMR DPT VST MAYX REQ PHY/QHP: CPT

## 2020-02-02 NOTE — ER DOCUMENT REPORT
HPI





- HPI


Time Seen by Provider: 02/02/20 16:52


Pain Level: Denies


Context: 





Patient is a 19-year-old male with a history of asthma who presents to the 

emergency department for medication refill.  Patient would like to have his 

albuterol refilled.  He denies any shortness of breath, difficulty breathing, 

chest pain, or any other symptoms right now.  Patient was seen last week for the

same issue.





- EENT


EENT: DENIES: Sore Throat, Ear Pain, Eye problems





- NEURO


Neurology: DENIES: Headache, Weakness, Vision blurred, Dizzinesss / Vertigo





- CARDIOVASCULAR


Cardiovascular: DENIES: Chest pain





- RESPIRATORY


Respiratory: DENIES: Trouble Breathing, Coughing





- GASTROINTESTINAL


Gastrointestinal: DENIES: Abdominal Pain, Black / Bloody Stools





- URINARY


Urinary: DENIES: Dysuria, Urgency, Frequency





- REPRODUCTIVE


Reproductive: DENIES: Pregnant:





- MUSCULOSKELETAL


Musculoskeletal: DENIES: Extremity pain





Past Medical History





- Social History


Smoking Status: Never Smoker


Family History: Reviewed & Not Pertinent


Patient has suicidal ideation: No


Patient has homicidal ideation: No


Pulmonary Medical History: Reports: Hx Asthma


Renal/ Medical History: Denies: Hx Peritoneal Dialysis





- Immunizations


Immunizations up to date: Yes


Hx Diphtheria, Pertussis, Tetanus Vaccination: Yes





Vertical Provider Document





- CONSTITUTIONAL


Agree With Documented VS: Yes


Exam Limitations: No Limitations


General Appearance: No Apparent Distress





- INFECTION CONTROL


TRAVEL OUTSIDE OF THE [place] IN LAST 30 DAYS: No





- HEENT


HEENT: Atraumatic, Normocephalic, PERRLA





- RESPIRATORY


Respiratory: Breath Sounds Normal, No Respiratory Distress





- CARDIOVASCULAR


Cardiovascular: Regular Rate, Regular Rhythm


Pulses: Normal: Radial





- MUSCULOSKELETAL/EXTREMETIES


Musculoskeletal/Extremeties: FROM





- NEURO


Level of Consciousness: Awake, Alert, Appropriate


Motor/Sensory: No Motor Deficit, No Sensory Deficit - .





- DERM


Integumentary: Warm, Dry, No Rash





Course





- Re-evaluation


Re-evalutation: 





02/02/20 


Had a very lengthy conversation with the patient about how it is inappropriate 

for him to continue to come here to the emergency department to have his 

albuterol refilled.  I explained to him that he needs to be on a inhaled cortic

osteroid to help manage his asthma.  He states that he was on them, but cannot 

afford them anymore now that he does not have insurance.  I have referred him to

[facility].  I called both [facility] and [facility] pharmacy and they 

both said that Flovent was $[phone] without insurance.  Patient states that he 

cannot afford this.  I have ordered Flovent for him and will refill his 

albuterol inhaler.  I properly instructed him to use his albuterol inhaler only 

as a rescue inhaler.  He is in agreement with this plan.  Follow-up precautions 

were given.  Verbal discharge instructions were given to the patient.  They 

verbalized understanding.  They are stable for discharge.











- Vital Signs


Vital signs: 


                                        











Temp Pulse Resp BP Pulse Ox


 


 97.6 F   98   18   157/78 H  100 


 


 02/02/20 16:34  02/02/20 16:34  02/02/20 16:34  02/02/20 16:34  02/02/20 16:34














Discharge





- Discharge


Clinical Impression: 


Asthma


Qualifiers:


 Asthma severity: unspecified severity Asthma persistence: unspecified Asthma 

complication type: uncomplicated Qualified Code(s): J45.909 - Unspecified 

asthma, uncomplicated





Condition: Stable


Disposition: HOME, SELF-CARE


Additional Instructions: 


You were seen today in the emergency department for a medication refill of your 

albuterol inhaler.  You are being placed on Flovent.  Take 2 puffs twice a day 

to help with your asthma.  Rinse your mouth out after you give yourself 2 puffs.

 Please only take the albuterol if you absolutely need it.  Please follow-up 

with [facility], as they may be able to help you with your asthma.  

If you develop any shortness of breath, difficulty breathing, or any symptoms 

that are worrisome to you, please return to the emergency department.


Referrals: 


[facility] [Provider Group] - Follow up tomorrow

## 2020-02-26 ENCOUNTER — HOSPITAL ENCOUNTER (EMERGENCY)
Dept: HOSPITAL 62 - ER | Age: 20
Discharge: LEFT BEFORE DISCHARGE | End: 2020-02-26
Payer: SELF-PAY

## 2020-02-26 VITALS — DIASTOLIC BLOOD PRESSURE: 91 MMHG | SYSTOLIC BLOOD PRESSURE: 149 MMHG

## 2020-02-26 DIAGNOSIS — Z53.21: Primary | ICD-10-CM

## 2020-02-26 DIAGNOSIS — Z76.0: ICD-10-CM

## 2020-02-27 ENCOUNTER — HOSPITAL ENCOUNTER (EMERGENCY)
Dept: HOSPITAL 62 - ER | Age: 20
Discharge: HOME | End: 2020-02-27
Payer: SELF-PAY

## 2020-02-27 VITALS — SYSTOLIC BLOOD PRESSURE: 161 MMHG | DIASTOLIC BLOOD PRESSURE: 85 MMHG

## 2020-02-27 DIAGNOSIS — J45.909: ICD-10-CM

## 2020-02-27 DIAGNOSIS — Z76.0: Primary | ICD-10-CM

## 2020-02-27 PROCEDURE — 99281 EMR DPT VST MAYX REQ PHY/QHP: CPT

## 2020-02-27 NOTE — ER DOCUMENT REPORT
HPI





- HPI


Time Seen by Provider: 02/27/20 11:10


Notes: 





Patient is a 19-year-old male with a history of asthma who presents for 

medication refill of an inhaler.  Patient states he is currently feeling well, 

but ran out of his inhaler.  Denies drug allergies.  He has no other concerns or

complaints at this time.  He is otherwise feeling well.  Denies any headache, 

fever, neck pain, URI, sore throat, chest pain, palpitations, syncope, cough, 

shortness of breath, wheeze, dyspnea, abdominal pain, nausea/vomiting/diarrhea, 

urinary retention, dysuria, hematuria, or rash.





- ROS


Systems Reviewed and Negative: Yes All other systems reviewed and negative





- REPRODUCTIVE


Reproductive: DENIES: Pregnant:





Past Medical History





- Social History


Smoking Status: Never Smoker


Family History: Reviewed & Not Pertinent


Pulmonary Medical History: Reports: Hx Asthma


Renal/ Medical History: Denies: Hx Peritoneal Dialysis





- Immunizations


Immunizations up to date: Yes


Hx Diphtheria, Pertussis, Tetanus Vaccination: Yes





Vertical Provider Document





- CONSTITUTIONAL


Agree With Documented VS: Yes


Notes: 





PHYSICAL EXAMINATION:





GENERAL: Well-appearing, well-nourished and in no acute distress.





HEAD: Atraumatic, normocephalic.





EYES: Pupils equal round and reactive to light, extraocular movements intact, 

sclera anicteric, conjunctiva are normal.





ENT: Nares patent and without discharge.  oropharynx clear without exudates.  No

tonsilar hypertrophy or erythema.  Moist mucous membranes.  





NECK: Normal range of motion, supple without lymphadenopathy





LUNGS: Breath sounds clear to auscultation bilaterally and equal.  No wheezes 

rales or rhonchi.





HEART: Regular rate and rhythm without murmurs, rubs, gallops.





Extremities:  No cyanosis, clubbing, or edema b/l.  





NEUROLOGICAL: Normal speech, normal gait.  





PSYCH: Normal mood, normal affect.  





SKIN: Warm, Dry, normal turgor, no rashes or lesions noted.





- INFECTION CONTROL


TRAVEL OUTSIDE OF THE [place] IN LAST 30 DAYS: No





Course





- Re-evaluation


Re-evalutation: 





02/27/20 11:14


Patient is an afebrile, well-hydrated, 19-year-old male who presents for 

medication refill of an inhaler.  He is currently asymptomatic.  Vitals are 

acceptable.  PE is otherwise unremarkable.  Patient is nontoxic-appearing and is

tolerating p.o. without difficulty.  No labs or imaging warranted.  Low 

suspicion for any systemic or emergent condition at this time.  Patient to 

recheck with his PCM in the next 3 to 5 days.  Return to the ED with any other 

worsening/concerning symptoms.  Patient is in agreement.





- Vital Signs


Vital signs: 


                                        











Temp Pulse Resp BP Pulse Ox


 


 97.7 F      20   161/85 H  99 


 


 02/27/20 10:37     02/27/20 10:37  02/27/20 10:37  02/27/20 10:37














Discharge





- Discharge


Clinical Impression: 


 Medication refill





Condition: Stable


Disposition: HOME, SELF-CARE


Additional Instructions: 


Maintain adequate fluid intake


tylenol/ibuprofen as needed 


over the counter cold medication as needed for symptoms


Humidified air may help


Wash your hands regularly


Wear a mask when coughing


F/u:  with your PCM in 3-5 days for a recheck





Return to the ED with any fever, altered mental status/behavior, chest pain, 

palpitations, syncope, headache, neck pain/stiffness, shortness of breath, chest

pains, wheezing, drooling, trouble swallowing/breathing, abdominal pain, n/v/d, 

rash, or worsening/concerning symptoms otherwise.


Prescriptions: 


Albuterol Sulfate [Proair HFA Inhalation Aerosol 8.5 gm MDI] 2 puff IH Q4H PRN 

#1 mdi


 PRN Reason: 


Forms:  Elevated Blood Pressure


Referrals: 


[facility] [Provider Group] - Follow up as needed

## 2020-04-28 ENCOUNTER — HOSPITAL ENCOUNTER (EMERGENCY)
Dept: HOSPITAL 62 - ER | Age: 20
Discharge: HOME | End: 2020-04-28
Payer: COMMERCIAL

## 2020-04-28 VITALS — DIASTOLIC BLOOD PRESSURE: 96 MMHG | SYSTOLIC BLOOD PRESSURE: 153 MMHG

## 2020-04-28 DIAGNOSIS — M25.512: ICD-10-CM
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
## 2020-04-28 DIAGNOSIS — R63.0: ICD-10-CM

## 2020-04-28 DIAGNOSIS — J45.909: ICD-10-CM

## 2020-04-28 DIAGNOSIS — M25.562: Primary | ICD-10-CM
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
## 2020-04-28 DIAGNOSIS — R51: ICD-10-CM

## 2020-04-28 PROCEDURE — 99283 EMERGENCY DEPT VISIT LOW MDM: CPT
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
